# Patient Record
Sex: MALE | Race: BLACK OR AFRICAN AMERICAN | NOT HISPANIC OR LATINO | URBAN - METROPOLITAN AREA
[De-identification: names, ages, dates, MRNs, and addresses within clinical notes are randomized per-mention and may not be internally consistent; named-entity substitution may affect disease eponyms.]

---

## 2024-10-04 ENCOUNTER — TELEPHONE (OUTPATIENT)
Dept: GASTROENTEROLOGY | Facility: HOSPITAL | Age: 64
End: 2024-10-04
Payer: MEDICARE

## 2024-12-16 ENCOUNTER — APPOINTMENT (OUTPATIENT)
Dept: OPHTHALMOLOGY | Facility: CLINIC | Age: 64
End: 2024-12-16
Payer: MEDICARE

## 2024-12-23 ENCOUNTER — OFFICE VISIT (OUTPATIENT)
Dept: GASTROENTEROLOGY | Facility: HOSPITAL | Age: 64
End: 2024-12-23
Payer: MEDICARE

## 2024-12-23 VITALS
WEIGHT: 247.2 LBS | BODY MASS INDEX: 34.48 KG/M2 | OXYGEN SATURATION: 98 % | HEART RATE: 71 BPM | SYSTOLIC BLOOD PRESSURE: 148 MMHG | DIASTOLIC BLOOD PRESSURE: 86 MMHG | TEMPERATURE: 97.3 F

## 2024-12-23 DIAGNOSIS — R19.5 POSITIVE COLORECTAL CANCER SCREENING USING COLOGUARD TEST: Primary | ICD-10-CM

## 2024-12-23 PROCEDURE — 1159F MED LIST DOCD IN RCRD: CPT | Performed by: INTERNAL MEDICINE

## 2024-12-23 PROCEDURE — 99203 OFFICE O/P NEW LOW 30 MIN: CPT | Performed by: INTERNAL MEDICINE

## 2024-12-23 PROCEDURE — 1036F TOBACCO NON-USER: CPT | Performed by: INTERNAL MEDICINE

## 2024-12-23 PROCEDURE — 99213 OFFICE O/P EST LOW 20 MIN: CPT | Performed by: INTERNAL MEDICINE

## 2024-12-23 PROCEDURE — 1126F AMNT PAIN NOTED NONE PRSNT: CPT | Performed by: INTERNAL MEDICINE

## 2024-12-23 RX ORDER — ATORVASTATIN CALCIUM 40 MG/1
TABLET, FILM COATED ORAL EVERY 24 HOURS
COMMUNITY
Start: 2021-09-01

## 2024-12-23 RX ORDER — FLUTICASONE PROPIONATE 50 MCG
2 SPRAY, SUSPENSION (ML) NASAL DAILY
COMMUNITY

## 2024-12-23 RX ORDER — AMLODIPINE AND VALSARTAN 5; 160 MG/1; MG/1
1 TABLET ORAL DAILY
COMMUNITY

## 2024-12-23 RX ORDER — METFORMIN HYDROCHLORIDE 500 MG/1
1 TABLET ORAL
COMMUNITY
Start: 2024-12-19

## 2024-12-23 SDOH — ECONOMIC STABILITY: FOOD INSECURITY: WITHIN THE PAST 12 MONTHS, YOU WORRIED THAT YOUR FOOD WOULD RUN OUT BEFORE YOU GOT MONEY TO BUY MORE.: NEVER TRUE

## 2024-12-23 SDOH — ECONOMIC STABILITY: FOOD INSECURITY: WITHIN THE PAST 12 MONTHS, THE FOOD YOU BOUGHT JUST DIDN'T LAST AND YOU DIDN'T HAVE MONEY TO GET MORE.: NEVER TRUE

## 2024-12-23 ASSESSMENT — LIFESTYLE VARIABLES
HOW OFTEN DO YOU HAVE SIX OR MORE DRINKS ON ONE OCCASION: NEVER
HOW MANY STANDARD DRINKS CONTAINING ALCOHOL DO YOU HAVE ON A TYPICAL DAY: 1 OR 2
AUDIT-C TOTAL SCORE: 1
HOW OFTEN DO YOU HAVE A DRINK CONTAINING ALCOHOL: MONTHLY OR LESS
SKIP TO QUESTIONS 9-10: 1

## 2024-12-23 ASSESSMENT — PATIENT HEALTH QUESTIONNAIRE - PHQ9
1. LITTLE INTEREST OR PLEASURE IN DOING THINGS: NOT AT ALL
2. FEELING DOWN, DEPRESSED OR HOPELESS: NOT AT ALL
SUM OF ALL RESPONSES TO PHQ9 QUESTIONS 1 & 2: 0

## 2024-12-23 ASSESSMENT — ENCOUNTER SYMPTOMS
WEAKNESS: 0
CONFUSION: 0
SHORTNESS OF BREATH: 0
COUGH: 0
TROUBLE SWALLOWING: 0
BRUISES/BLEEDS EASILY: 0
FEVER: 0
CHILLS: 0
COLOR CHANGE: 0
LIGHT-HEADEDNESS: 0
EYE REDNESS: 0
UNEXPECTED WEIGHT CHANGE: 0

## 2024-12-23 ASSESSMENT — PAIN SCALES - GENERAL: PAINLEVEL_OUTOF10: 0-NO PAIN

## 2024-12-23 NOTE — PATIENT INSTRUCTIONS
Schedule for diagnostic colonoscopy.  Patient would prefer to have this done under moderate sedation.  Recommend holding marijuana for 2 weeks prior to the procedure.  Follow-up as needed.

## 2024-12-23 NOTE — PROGRESS NOTES
Subjective     History of Present Illness:   Reji Montes is a 64 y.o. male with diabetes mellitus, hypertension, and dyslipidemia who presented to GI clinic for positive Cologuard.  Patient tested positive for Cologuard on 8/20/24.  That was done for colon cancer screening.  Patient denies nausea, vomiting, abdominal pain, blood in his stools, change in bowel habits, or weight loss.  He had a colonoscopy over 10 years ago that was reportedly normal.      Review of Systems  Review of Systems   Constitutional:  Negative for chills, fever and unexpected weight change.   HENT:  Negative for trouble swallowing.    Eyes:  Negative for redness.   Respiratory:  Negative for cough and shortness of breath.    Cardiovascular:  Negative for chest pain.   Skin:  Negative for color change.   Neurological:  Negative for weakness and light-headedness.   Hematological:  Does not bruise/bleed easily.   Psychiatric/Behavioral:  Negative for confusion.    All other systems reviewed and are negative.      Past Medical History   has a past medical history of Diabetes mellitus (Multi), Hyperlipidemia, and Hypertension.     Social History   reports that he has quit smoking. His smoking use included cigarettes. He has never used smokeless tobacco. He reports current alcohol use. He reports current drug use. Drug: Marijuana.     Family History  family history is not on file.   No family history of colon cancer.     Allergies  No Known Allergies    Medications  Current Outpatient Medications   Medication Instructions    amlodipine-valsartan (Exforge) 5-160 mg tablet 1 tablet, Daily    atorvastatin (Lipitor) 40 mg tablet Every 24 hours    fluticasone (Flonase) 50 mcg/actuation nasal spray 2 sprays, Daily    metFORMIN (Glucophage) 500 mg tablet 1 tablet, Daily (0630)        Objective   Visit Vitals  /86   Pulse 71   Temp 36.3 °C (97.3 °F)      Vitals:    12/23/24 0846   Weight: 112 kg (247 lb 3.2 oz)     Body mass index is 34.48  "kg/m².  Physical Exam  Constitutional:       General: He is not in acute distress.     Appearance: Normal appearance.   HENT:      Head: Normocephalic and atraumatic.      Mouth/Throat:      Mouth: Mucous membranes are moist.   Eyes:      General: No scleral icterus.  Cardiovascular:      Rate and Rhythm: Normal rate and regular rhythm.      Heart sounds: No murmur heard.  Pulmonary:      Effort: Pulmonary effort is normal.      Breath sounds: Normal breath sounds.   Abdominal:      General: Bowel sounds are normal.      Palpations: Abdomen is soft. There is no mass.      Tenderness: There is no abdominal tenderness. There is no guarding or rebound.   Musculoskeletal:         General: No swelling.      Cervical back: Neck supple.   Skin:     General: Skin is warm.      Coloration: Skin is not jaundiced.   Neurological:      Mental Status: He is alert and oriented to person, place, and time.   Psychiatric:         Mood and Affect: Mood normal.         Labs  Lab Results   Component Value Date    WBC 7.9 09/16/2022    HGB 14.4 09/16/2022    HCT 45.6 09/16/2022    MCV 92 09/16/2022     09/16/2022     Lab Results   Component Value Date    GLUCOSE 104 (H) 09/16/2022    CALCIUM 8.7 09/16/2022     09/16/2022    K 3.4 (L) 09/16/2022    CO2 27 09/16/2022     09/16/2022    BUN 9 09/16/2022    CREATININE 0.85 09/16/2022     Lab Results   Component Value Date    ALT 22 09/16/2022    AST 17 09/16/2022    ALKPHOS 59 09/16/2022    BILITOT 0.4 09/16/2022     Lab Results   Component Value Date    INR 1.1 09/16/2022     No results found for: \"CALPS\"    Assessment   Reji Montes is a 64 y.o. male with diabetes mellitus, hypertension, and dyslipidemia who presented to GI clinic for positive Cologuard.  Patient has no GI symptoms.  His last colonoscopy was over 10 years ago and reportedly unremarkable.     Plan  Schedule for diagnostic colonoscopy.  Patient would prefer to have this done under moderate " sedation.  Recommend holding marijuana for 2 weeks prior to the procedure.  Follow-up as needed.    Geovani Cox MD